# Patient Record
Sex: MALE | Race: WHITE | URBAN - METROPOLITAN AREA
[De-identification: names, ages, dates, MRNs, and addresses within clinical notes are randomized per-mention and may not be internally consistent; named-entity substitution may affect disease eponyms.]

---

## 2023-06-08 ENCOUNTER — OFFICE VISIT (OUTPATIENT)
Facility: LOCATION | Age: 38
End: 2023-06-08
Payer: COMMERCIAL

## 2023-06-08 DIAGNOSIS — H04.123 DRY EYE SYNDROME OF BILATERAL LACRIMAL GLANDS: ICD-10-CM

## 2023-06-08 DIAGNOSIS — H16.393 OTHER INTERSTITIAL AND DEEP KERATITIS, BILATERAL: Primary | ICD-10-CM

## 2023-06-08 PROCEDURE — 99204 OFFICE O/P NEW MOD 45 MIN: CPT | Performed by: OPHTHALMOLOGY

## 2023-06-08 RX ORDER — PREDNISOLONE ACETATE 10 MG/ML
1 % SUSPENSION/ DROPS OPHTHALMIC
Qty: 5 | Refills: 1 | Status: ACTIVE
Start: 2023-06-08

## 2023-06-08 ASSESSMENT — INTRAOCULAR PRESSURE
OD: 15
OS: 13

## 2023-06-08 NOTE — IMPRESSION/PLAN
Impression: Examination revealed dry eye syndrome secondary to tear deficiencies. Plan: Recommend use of ATs QID OU.

## 2023-06-08 NOTE — IMPRESSION/PLAN
Impression: Patient presents today for evaluation of peripheral cornea infiltrates OU. Referred by Dr. Corin Mata. OD: SN/IN/IT Stromal small infiltrates, (-) epi defect OS: IT linear stromal infiltrate, (-) epi defect Plan: Today:
Patient reports irritation and tearing OU began yesterday. Patient saw Dr. Corin Mata today and was Rx ofloxacin. Informed patient examination today showed corneal inflammation OU, no signs of infection. Plan:
Start Prednisolone BID OU. Start Ofloxacin QID OU (Rx from Dr. Corin Mata). RTC Monday for re-evaluation. Informed patient to refrain from swimming or wearing contact lenses. RTC PRN any worsening in symptoms.

## 2023-06-08 NOTE — IMPRESSION/PLAN
Impression: Patient presents today for evaluation of peripheral cornea infiltrates OU. Referred by Dr. Vikas Hernandes. OD: SN/IN/IT Stromal small infiltrates, (-) epi defect OS: IT linear stromal infiltrate, (-) epi defect Plan: Today:
Patient reports irritation and tearing OU began yesterday. Patient saw Dr. Vikas Hernandes today and was Rx ofloxacin. Informed patient examination today showed corneal inflammation OU, no signs of infection. Plan:
Start Prednisolone BID OU. Start Ofloxacin QID OU (Rx from Dr. Vikas Hernandes). RTC Monday for re-evaluation. Informed patient to refrain from swimming or wearing contact lenses. RTC PRN any worsening in symptoms.